# Patient Record
(demographics unavailable — no encounter records)

---

## 2025-01-02 NOTE — ASSESSMENT
[FreeTextEntry1] : This is a 68-year-old man with a prior history of stroke. He received t-PA and made a good recovery. He had some symptoms of carpal tunnel in the past. This is not bothering him at present.  At this point, given the presence of aortic arch atheroma, I would recommend he continue Plavix and statin.  He has been having some mild memory difficulties lately. He scored 29 out of 30 on Mini-Mental state examination only missing 1 point in the area of recall. I will obtain an EEG to rule out any epileptiform abnormalities. I will obtain some blood test to look for reversible causes of memory loss.  I would be happy to see him back in one year.

## 2025-01-02 NOTE — HISTORY OF PRESENT ILLNESS
[FreeTextEntry1] : I saw this patient in the office today.  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 He had recovered well from this. He had presented with left-sided weakness and received thrombolysis. 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 For this reason, he had been started on Plavix and statin.  I had seen him in 2017 for some symptoms of carpal tunnel. This has not been bothering him recently.  1/2/2025 visit: He reports that he is memory is not what it used to be.

## 2025-01-02 NOTE — CONSULT LETTER
[Dear  ___] : Dear ~CORTES, [Courtesy Letter:] : I had the pleasure of seeing your patient, [unfilled], in my office today. [Please see my note below.] : Please see my note below. [Sincerely,] : Sincerely, [FreeTextEntry3] : John Daugherty MD.

## 2025-01-02 NOTE — PHYSICAL EXAM
[General Appearance - Alert] : alert [Oriented To Time, Place, And Person] : oriented to person, place, and time [Cranial Nerves Optic (II)] : visual acuity intact bilaterally,  visual fields full to confrontation, pupils equal round and reactive to light [Cranial Nerves Oculomotor (III)] : extraocular motion intact [Cranial Nerves Trigeminal (V)] : facial sensation intact symmetrically [Cranial Nerves Facial (VII)] : face symmetrical [Cranial Nerves Vestibulocochlear (VIII)] : hearing was intact bilaterally [Cranial Nerves Glossopharyngeal (IX)] : tongue and palate midline [Cranial Nerves Accessory (XI - Cranial And Spinal)] : head turning and shoulder shrug symmetric [Cranial Nerves Hypoglossal (XII)] : there was no tongue deviation with protrusion [Motor Tone] : muscle tone was normal in all four extremities [Motor Strength] : muscle strength was normal in all four extremities [Sensation Tactile Decrease] : light touch was intact [Sensation Pain / Temperature Decrease] : pain and temperature was intact [Sensation Vibration Decrease] : vibration was intact [Abnormal Walk] : normal gait [2+] : Ankle jerk left 2+ [Optic Disc Abnormality] : the optic disc were normal in size and color [Arterial Pulses Carotid] : carotid pulses were normal with no bruits [Total Score ___ / 30] : the patient achieved a score of [unfilled] /30 [Date / Time ___ / 5] : date / time [unfilled] / 5 [Place ___ / 5] : place [unfilled] / 5 [Registration ___ / 3] : registration [unfilled] / 3 [Serial Sevens ___/5] : serial sevens [unfilled] / 5 [Naming 2 Objects ___ / 2] : naming two objects [unfilled] / 2 [Repeating a Sentence ___ / 1] : repeating a sentence [unfilled] / 1 [Writing a Sentence ___ / 1] : write sentence [unfilled] / 1 [3-stage Verbal Command ___ / 3] : three-stage verbal command [unfilled] / 3 [Written Command ___ / 1] : written command [unfilled] / 1 [Copy a Design ___ / 1] : copy a design [unfilled] / 1 [Recall ___ / 3] : recall [unfilled] / 3 [FreeTextEntry1] : BP by MD: 148/86 [Aphasia] : no dysphasia/aphasia [Romberg's Sign] : Romberg's sign was negtive [Coordination - Dysmetria Impaired Finger-to-Nose Bilateral] : not present [Plantar Reflex Right Only] : normal on the right [Plantar Reflex Left Only] : normal on the left [FreeTextEntry9] : Tinel sign is absent at the wrists and elbows bilaterally.

## 2025-01-02 NOTE — DATA REVIEWED
[de-identified] : Brain MRI from 8/17/12 demonstrated acute right MCA territory infarct.  MRA was unremarkable.